# Patient Record
Sex: FEMALE | Race: WHITE | Employment: UNEMPLOYED
[De-identification: names, ages, dates, MRNs, and addresses within clinical notes are randomized per-mention and may not be internally consistent; named-entity substitution may affect disease eponyms.]

---

## 2020-11-24 ENCOUNTER — TRANSCRIBE ORDERS (OUTPATIENT)
Dept: OTHER | Age: 45
End: 2020-11-24

## 2020-11-24 DIAGNOSIS — C50.919 BREAST CANCER (H): Primary | ICD-10-CM

## 2020-11-25 ENCOUNTER — DOCUMENTATION ONLY (OUTPATIENT)
Dept: ONCOLOGY | Facility: CLINIC | Age: 45
End: 2020-11-25

## 2020-11-25 NOTE — PROGRESS NOTES
RECORDS STATUS - BREAST    RECORDS REQUESTED FROM: [employer] [facility]   DATE REQUESTED: N/A   NOTES DETAILS STATUS   OFFICE NOTE from referring provider     OFFICE NOTE from medical oncologist Complete CE    OFFICE NOTE from surgeon Complete See Biopsy Report Below    OFFICE NOTE from radiation oncologist     DISCHARGE SUMMARY from hospital     DISCHARGE REPORT from the ER     OPERATIVE REPORT Complete See Surgical Path Report In CE   MEDICATION LIST Complete CE   CLINICAL TRIAL TREATMENTS TO DATE     LABS     PATHOLOGY REPORTS  (Tissue diagnosis, Stage, ER/NJ percentage positive and intensity of staining, HER2 IHC, FISH, and all biopsies from breast and any distant metastasis)                 Received on 12/4/2020 12:19pm [facility]  Tracking Number:   [id number]   Surgical Pathology   11/13/2020                               Case: [id number]    10/29/2020  Case: [id number]    GENONOMIC TESTING     TYPE:   (Next Generation Sequencing, including Foundation One testing, and Oncotype score)     IMAGING (NEED IMAGES & REPORT)     CT SCANS     MRI Complete- [facility] MR Bx Breast 11/13/2020, 11/6/2020   Xray Chest Complete- [facility] 1/20/2017   MAMMO Complete- [facility] 11/13/2020, 10/29/2020, 10/22/2020, 1/22/2019, 5/18/2017   ULTRASOUND Complete- [facility] 10/29/2020   PET     BONE SCAN     BRAIN MRI           Action    Action Taken 11/25/2020 3:16pm     I faxed requests over to [facility] for IMG and Bx slides

## 2020-12-07 PROCEDURE — 999N001032 HC STATISTIC REVIEW OUTSIDE SLIDES TC 88321: Performed by: SURGERY

## 2020-12-07 PROCEDURE — 88321 CONSLTJ&REPRT SLD PREP ELSWR: CPT | Mod: 26 | Performed by: PATHOLOGY

## 2020-12-08 LAB — COPATH REPORT: NORMAL

## 2020-12-09 NOTE — PROGRESS NOTES
NEW SURGICAL CONSULTATION  Dec 10, 2020    Diana Rutherford is a 45 year old woman who presents with a right  breast complaint.  She was self-referred for a second opinion.    HPI:    She noted no masses in either breast, axilla, or neck. She denies any nipple discharge. She has longstanding bilateral nipple inversion.     Imaging in 2020 showed a 1.0 mass with surrounding calcifications measuring 3.4 cm at 3:00 6 cm FN in the RIGHT breast. Additional calcifications were seen at 6:00 in the RIGHT breast.    A biopsy was performed of the 3:00 mass and a clip was placed.  The pathology read from [facility] was invasive lobular carcinoma.  A biopsy was also performed of the 6:00 area, which showed atypical lobular hyperplasia and flat epithelial atypia.    She was then evaluated by Dr Lorna Hidalgo. A breast MRI was obtained, which showed the 11 mm 3:00 RIGHT breast mass with biopsy clip, and additional 12:00 7 mm non mass enhancement in the RIGHT.  On the LEFT, nonmass enhancement was seen at 2:00 and at 5-6:00.  The 2:00 LEFT breast area was biopsied and was benign.    The slides for the RIGHT 3:00 cancer was reviewed here, which showed invasive mixed ductal and lobular carcinoma, grade 2, ER+ MT+ HER2/ilan negative.    BREAST-SPECIFIC HISTORY:  Prior breast biopsies: No  Prior breast surgeries: No  Prior radiation history: No  Hormone replacement therapy: Yes intermittently since  (off for several years), consistent since [id number]  Bra size: 32 C  Dominant hand: Right    GYN HISTORY:    Age at 1st pregnancy: n/a  Age at menarche: 14  Breastfeeding history: No  Menopausal? Post - GAY BSO in  for endometriosis    FAMILY HISTORY:  Breast ca: No  Ovarian ca: No  Pancreatic ca: No  Melanoma: No - father with skin cancer of some sort on H&N, also multiple myeloma  Gastric ca: No  Colon ca: No  Other cancer: Yes - MGF w/ lung ca    Genetic testing at [facility] (11/10/2020):  - VUS in DIS3L2  -  VUS in POLE  - no pathogenic variants in: ABRAXAS1, AIP, AKT1, ALK, APC, LISANDRO, AXIN2, BAP1, BARD1, BLM, BMPR1A, BRCA1, BRCA2, BRIP1, CASR, CDC73, CDH1, CDK4, CDKN1B, CDKN1C, CDKN2A (p14ARF), CDKN2A ([id number]), CEBPA, CHEK2, CTNNA1, DICER1, DIS3L2, EGFR, EPCAM*, FANCC, FANCM, FH, FLCN, GATA2, GPC3, GREM1*, HOXB13, HRAS, KIT, MAX, MEN1, MET, MITF*, MLH1, MRE11, MSH2, MSH3, MSH6, MUTYH, NBN, NF1, NF2, NTHL1, PALB2, PDGFRA, PHOX2B*, PIK3CA, PMS2, POLD1, POLE, POT1, JDZSR6P, PTCH1, PTEN, RAD50, RAD51C, RAD51D, RB1, RECQL, RECQL4, RET, RINT1, RUNX1, SDHA*, SDHAF2, SDHB, SDHC, SDHD, SMAD4, SMARCA4, SMARCB1, SMARCE1, STK11, SUFU, TERC, TERT, AAVR388, TP53, TSC1, TSC2, VHL, WRN*, WT1, XRCC2    Past Medical History:   Diagnosis Date     Asthma      Human immunodeficiency virus (HIV) disease (H)      Past Surgical History:   Procedure Laterality Date     HYSTERECTOMY TOTAL ABDOMINAL, BILATERAL SALPINGO-OOPHORECTOMY, COMBINED     No GA issues    Current Outpatient Medications   Medication Sig Dispense Refill     bictegravir-emtricitabine-tenofovir (BIKTARVY) -25 MG per tablet Take 1 Tablet by mouth daily.       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 Has horses and is buying a hobby farm. Plans on moving just prior to the new year.    ROS:  Back pain: No  Headache: No  Abdominal pain: No  Unexpected weight loss: No  Easy bruising/bleeding: No    /70   Pulse 74   Temp 98.6  F (37  C) (Oral)   Wt 59.6 kg (131 lb 4.8 oz)   SpO2 99%    Physical Exam  Constitutional:       Appearance: She is well-developed.   Chest:      Breasts: Breasts are symmetrical.         Right: No inverted nipple, mass, nipple discharge, skin change or tenderness.         Left: No inverted nipple, mass, nipple discharge, skin change or  tenderness.          Comments: Patient was examined in both supine and upright positions.   Lymphadenopathy:      Cervical: No cervical adenopathy.      Right cervical: No superficial, deep or posterior cervical adenopathy.     Left cervical: No superficial, deep or posterior cervical adenopathy.      Upper Body:      Right upper body: No supraclavicular, axillary or pectoral adenopathy.      Left upper body: No supraclavicular, axillary or pectoral adenopathy.      Comments: No lymphedema in bilateral upper extremities.   Skin:     General: Skin is warm and dry.        INVESTIGATIONS:    Bilateral Breast MRI from [facility] (11/6/2020) showed:  RIGHT BREAST: There is extreme fibroglandular tissue.  There is moderate background parenchymal enhancement. Biopsy-proven malignancy in the medial breast is seen as a 10 x 10 x 11 mm enhancing mass with mixed persistent and plateau enhancement kinetics at 3:00 6 cm from the nipple at posterior depth. Biopsy marking clip is seen within the mass. Otherwise, there is mild nonmass enhancement with type I persistent kinetics in the inferior breast posterior depth at site of biopsy yielding flat epithelial atypia. Focal 7 x 6 mm nonmass enhancement in the superior breast at 12:00 middle depth with type I persistent enhancement kinetics (CADstream MIP images and subtracted axial series image 73/120); this area appears similar to the two areas in the left breast. It is unclear if this represents background enhancement. No axillary or internal mammary chain adenopathy.   LEFT BREAST: There is extreme fibroglandular tissue.  There is mild background parenchymal enhancement. There are a couple areas of vague nonspecific nonmass enhancement with type I persistent kinetics in the lateral breast at 2:00 middle depth (CADstream MIP images and subtracted axial series image 57/120) and in the inferior breast at 5-6 o'clock posterior depth (image 40/120). No axillary or internal mammary  chain adenopathy.  IMPRESSION:   1. Biopsy-proven malignancy in the medial right breast at 3:00 posterior depth measures 10 x 10 x 11 mm with mild nonmass enhancement noted in the inferior breast at 6:00 posterior depth at site of biopsy yielding flat epithelial atypia.  2. Focal nonmass enhancement in the superior right breast at 12:00 middle depth as well as two similar areas in the lateral left breast and inferior left breast. These areas demonstrate mild nonspecific enhancement with persistent type I enhancement kinetics. Given known right invasive lobular carcinoma, MRI-guided biopsy of one of these areas is recommended, such as the right 12:00 middle depth.  3. No axillary or internal mammary chain adenopathy.  ACR BI-RADS Category 4: Suspicious.    Diagnostic Mammogram & Ultrasound from [facility] (10/29/2020) showed:  MAMMOGRAPHIC FINDINGS: Right full-field digital diagnostic mammogram performed. The right breast is extremely dense, which lowers the sensitivity of mammography. Images evaluated with the assistance of CAD. Magnification views of the area of interest in the right breast were performed. There is a spiculated mass visualized in the right breast at 3:00 posterior depth. There are numerous groups of punctate calcifications nearly circumferentially around the mass with greatest extent measuring up to 3.4 cm (anteroposterior). Additionally there are punctate and coarse heterogeneous calcifications seen at 6 o'clock position posterior depth.  ULTRASOUND FINDINGS: Targeted ultrasound performed of the right breast demonstrates a 1.0 x 0.6 x 1.0 cm mass at 3:00 6 cm from the nipple. This correlates with the mass seen on mammography. There is adjacent vascularity demonstrated on color flow imaging. No other abnormalities identified sonographically.  IMPRESSION:  1. Spiculated mass in the right breast at 3:00 6 cm from the nipple is highly suspicious of malignancy. Multiple groupings of punctate  calcifications along the periphery of the mass are also suspicious for ductal carcinoma in situ (DCIS). Given their close proximity to the mass. Separate stereotactic biopsy of those calcifications was not performed at this time. If in the course of the management of the patient, biopsy of a set of these calcifications is needed, that can be performed on a later date.  2. Grouping of punctate and coarse heterogeneous calcifications in the right breast at 6:00 posterior depth are suspicious abnormality. Given their distance from the mass and other calcifications, stereotactic biopsy is recommended and was subsequently performed today.  ACR BI-RADS Category 5: Highly Suggestive of Malignancy.    [facility]10[facility]22[facility]2020) showed:  FINDINGS: Bilateral screening mammogram was performed with the assistance  of Computer-Aided Detection and breast tomosynthesis. The breasts are extremely dense, which lowers the sensitivity of mammography.   There are indeterminate calcifications in the right breast at the 6  o'clock position at posterior depth and 5 o'clock position at posterior depth.  The remainder of the breast tissue is unremarkable.  ACR BI-RADS Category 0: Need Additional Imaging Evaluation    Biopsy from [facility] (11/13/2020) showed:  A.  Breast, left, 2:00 middle depth, needle core biopsy:  Benign breast tissue with no significant pathologic abnormality.  Negative for atypia and malignancy.    Biopsy (10/29/2020) showed:  FINAL DIAGNOSIS:   CASE FROM [place], [place] ([id number], OBTAINED   10/29/2020):   A. RIGHT BREAST, 3:00, 6 CM FROM NIPPLE, NEEDLE CORE BIOPSY:   - INVASIVE MAMMARY CARCINOMA WITH MIXED LOBULAR AND DUCTAL FEATURES, Peyton grade 2, 10 mm in linear extent.   - Microscopic calcifications associated with invasive carcinoma.   - Other findings include apocrine metaplasia and microcysts.   - Invasive carcinoma is estrogen receptor positive (70-80%  nuclei, moderate to strong staining), progesterone receptor positive (80-85% nuclei, strong staining), and Ysx5rar gene is negative/not amplified by immunohistochemistry (score 1+).   B. RIGHT BREAST, CALCIFICATIONS, 6:00 POSTERIOR, NEEDLE CORE BIOPSY:   - Atypical lobular hyperplasia.   - Fibrocystic change with apocrine metaplasia and intraluminal calcifications.   - Negative for invasive malignancy.   C. RIGHT BREAST, NON-CALCIFICATIONS, 6:00 POSTERIOR, NEEDLE CORE BIOPSY:   - Focal flat epithelial atypia, columnar cell change, micropapillary hyperplasia and usual ductal hyperplasia.   - Negative for in-situ or invasive malignancy.   COMMENT:   Appropriately controlled immunostains are received from the referring institution. The carcinoma in part A shows only rare neoplastic cells with retained E-cadherin expression. However, in some areas there is clear morphologic evidence of duct/atypical gland formations, therefore this neoplasm is best classified as invasive breast carcinoma with mixed lobular and ductal features.     ASSESSMENT:  Diana Rutherford is a 45 year old woman with RIGHT breast cancer (3:00) and right breast atypical lobular hyperplasia (6:00).    Her stage is:  Cancer Staging  Malignant neoplasm of upper-inner quadrant of right breast in female, estrogen receptor positive (H)  Staging form: Breast, AJCC 8th Edition  - Clinical: Stage IA (cT1c, cN0, cM0, G2, ER+, MS+, HER2-) - Signed by Jess Medina MD on 12/10/2020    I personally reviewed her breast imaging with our in-house radiologist.  If she is considering breast conservation, I would recommend needle biopsy of the 12:00 RIGHT enhancing mass seen on MRI.    I reviewed the imaging, diagnosis, staging, and management (including surgery, chemotherapy, radiation therapy, and endocrine therapy) of breast cancer with Diana Rutherford and her  by phone. A copy of the biopsy pathology report was also provided.    The mainstay of  "treatment for resectable breast cancer is surgical resection, in the form of either breast conservation (segmental mastectomy plus radiation) or mastectomy.  We reviewed that the two strategies are equivalent in terms of overall survival.  The advantages and disadvantages of each were discussed.   Diana Rutherford IS a candidate for breast conservation therapy.  We discussed that this involves two necessary components: the lumpectomy (or \"segmental mastectomy\"), and several weeks of whole breast radiation therapy.  We discussed that the overall survival after breast conservation therapy is identical to mastectomy and that local recurrence rates are significantly higher if segmental mastectomy was performed without subsequent radiation.  We also discussed the significance of clear margins and that a subsequent procedure may be necessary to achieve this.    We discussed that both the cancer at 3:00 and the atypia at 6:00 should be resected.  Because the 6:00 lesion is not palpable, a wire-localized approach would be taken for breast conservation.  She would present on the day of surgery for an image-guided wire placement, followed by a surgical excision in the operating room.  The risks of a wire-localized segmental mastectomy were discussed with the patient and family, including the risks of bleeding, wound infection, wound dehiscence, and post-operative contour change to the breast.   We discussed that a Plastic Surgery consultation could be considered for possible oncoplastic strategies.    Alternatively, we also discussed the various types of mastectomy, including total, skin-sparing, and nipple-sparing mastectomy.  We reviewed that the nipple-sparing technique is cosmetic; sensation and contractility will likely be lost.  Diana Rutherford is a candidate for nipple-sparing mastectomy.  The risks of a mastectomy were discussed with the patient and family, including the risks of bleeding, wound infection, wound " dehiscence, skin flap/nipple necrosis, poor cosmetic outcomes with skin folds, decreased shoulder range of motion, chest wall numbness, etc.    The option of having immediate versus delayed reconstruction was also discussed.   We reviewed that the advantages of immediate reconstruction includes superior cosmetics, as the skin is preserved.  However, the major disadvantage is increased postoperative risks, including skin flap ischemia and expander infection, which can potentially delay adjuvant oncologic treatments which may be needed post-surgically.  Diana Rutherford was interested in this; a Plastic Surgery consultation was offered and will be arranged. Depending on the margin and rocco status post-mastectomy, radiation may be necessary.    With regards to the contralateral breast, we reviewed the risk reduction benefits of a contralateral risk-reducing mastectomy. We reviewed that a risk-reducing mastectomy does not eliminate the risk of breast cancer entirely, but rather is a relative risk reduction strategy. Given her negative genetic testing, the oncologic benefit of a risk-reducing mastectomy is low. She is no longer interested in a contralateral risk-reducing mastectomy.      In addition to the surgical management of the breast, a sentinel lymph node biopsy is recommended for rocco staging of the axilla.  This is performed with the combination of the radioactive colloid and lymphazurin. The risks of a sentinel lymph node biopsy were discussed with the patient and family, including the risks of lymphedema (5-10%), bleeding, wound infection, wound dehiscence, seroma formation, and paresthesias. There is an approximately 10% false negative rate associated with sentinel lymph node biopsy as published in the literature.  The findings of the sentinel lymph node biopsy may result in the need for further surgery (i.e. Axillary lymph node dissection) or radiation. There is a 1-2% chance of patients whose sentinel lymph  nodes do not map despite dual tracer (radiocolloid and lymphazurin). Should this be the case, we discussed that I would proceed with an axillary lymph node dissection at the index procedure.  The higher risks of an axillary lymph node dissection were also reviewed, including lymphedema (20-30%), bleeding, wound infection, wound dehiscence, seroma formation, nerve injury, limited arm range of motion and paresthesias. We discussed that a drain would be placed intra-operatively should an axillary lymph node dissection be performed.     We discussed that surgical pathology results will be reviewed at the postoperative visit to allow for careful discussion of next steps and for answering questions.    Finally, we reviewed that as part of team-based approach to breast cancer, medical oncology and radiation oncology referrals will also be made after surgery to discuss adjuvant systemic/endocrine therapy and radiation therapy.  The decision regarding adjuvant chemotherapy will be made after surgery.     We reviewed that patients with COVID who undergo surgery have increased perioperative risks and risks of severe COVID-19 disease. If her preoperative testing is positive or if she is symptomatic, surgery would be rescheduled.       All of the above was discussed with Diana Rutherford and all questions were answered.  She elected to proceed with MRI guided right breast biopsy.  She will consider her surgical options and call the office with a decision.    Total time spent with the patient was 90 minutes, of which 75% was counseling.     PLAN:  1. MRI guided RIGHT breast biopsy  2. Plastic surgery referral  3. RIGHT wire-localized segmental mastectomy (3:00 cancer AND 6:00 atypia) vs RIGHT mastectomy (skin-sparing, nipple-sparing, or simple)  4. RIGHT axillary sentinel lymph node biopsy, possible axillary node dissection  5. Patient to report to her PCP for preoperative H&P and testing.    Jess Medina MD MSc [facility] FACS  Assistant    Division of Surgical Oncology  [facility]

## 2020-12-10 ENCOUNTER — OFFICE VISIT (OUTPATIENT)
Dept: ONCOLOGY | Facility: CLINIC | Age: 45
End: 2020-12-10
Attending: SURGERY
Payer: COMMERCIAL

## 2020-12-10 ENCOUNTER — TELEPHONE (OUTPATIENT)
Dept: PLASTIC SURGERY | Facility: CLINIC | Age: 45
End: 2020-12-10

## 2020-12-10 VITALS
OXYGEN SATURATION: 99 % | WEIGHT: 131.3 LBS | HEART RATE: 74 BPM | DIASTOLIC BLOOD PRESSURE: 70 MMHG | TEMPERATURE: 98.6 F | SYSTOLIC BLOOD PRESSURE: 130 MMHG

## 2020-12-10 DIAGNOSIS — C50.211 MALIGNANT NEOPLASM OF UPPER-INNER QUADRANT OF RIGHT BREAST IN FEMALE, ESTROGEN RECEPTOR POSITIVE (H): ICD-10-CM

## 2020-12-10 DIAGNOSIS — Z17.0 MALIGNANT NEOPLASM OF UPPER-INNER QUADRANT OF RIGHT BREAST IN FEMALE, ESTROGEN RECEPTOR POSITIVE (H): ICD-10-CM

## 2020-12-10 PROCEDURE — G0463 HOSPITAL OUTPT CLINIC VISIT: HCPCS

## 2020-12-10 PROCEDURE — 99205 OFFICE O/P NEW HI 60 MIN: CPT | Performed by: SURGERY

## 2020-12-10 RX ORDER — BICTEGRAVIR SODIUM, EMTRICITABINE, AND TENOFOVIR ALAFENAMIDE FUMARATE 50; 200; 25 MG/1; MG/1; MG/1
TABLET ORAL
COMMUNITY
Start: 2020-02-03

## 2020-12-10 RX ORDER — LORATADINE 10 MG/1
TABLET ORAL
COMMUNITY

## 2020-12-10 SDOH — HEALTH STABILITY: MENTAL HEALTH: HOW OFTEN DO YOU HAVE A DRINK CONTAINING ALCOHOL?: MONTHLY OR LESS

## 2020-12-10 SDOH — HEALTH STABILITY: MENTAL HEALTH: HOW MANY STANDARD DRINKS CONTAINING ALCOHOL DO YOU HAVE ON A TYPICAL DAY?: NOT ASKED

## 2020-12-10 SDOH — HEALTH STABILITY: MENTAL HEALTH: HOW OFTEN DO YOU HAVE 6 OR MORE DRINKS ON ONE OCCASION?: NOT ASKED

## 2020-12-10 ASSESSMENT — PAIN SCALES - GENERAL: PAINLEVEL: NO PAIN (0)

## 2020-12-10 NOTE — LETTER
Date:December 15, 2020      Patient was self referred, no letter generated. Do not send.        [facility] Health Information

## 2020-12-10 NOTE — NURSING NOTE
Oncology Rooming Note    December 10, 2020 12:08 PM   Diana Rutherford is a 45 year old female who presents for:    Chief Complaint   Patient presents with     Oncology Clinic Visit     Breast Cancer     Initial Vitals: /70   Pulse 74   Temp 98.6  F (37  C) (Oral)   Wt 59.6 kg (131 lb 4.8 oz)   SpO2 99%  There is no height or weight on file to calculate BMI. There is no height or weight on file to calculate BSA.  No Pain (0) Comment: Data Unavailable   No LMP recorded.  Allergies reviewed: Yes  Medications reviewed: Yes    Medications: Medication refills not needed today.  Pharmacy name entered into [employer]: [id number] - [place], [place] - [address]    Clinical concerns: No specific concern       Ne Castañeda CMA

## 2020-12-10 NOTE — TELEPHONE ENCOUNTER
LVM for patient to call back to schedule an appt with Dr. Preciado. Referral from Dr. Medina.    Linwood Arceo LPN

## 2020-12-10 NOTE — LETTER
12/10/2020         RE: Diana Rutherford  [address] [facility] [address]        Dear Colleague,    Thank you for referring your patient, Diana Rutherford, to the [facility] BREAST [facility]. Please see a copy of my visit note below.    NEW SURGICAL CONSULTATION  Dec 10, 2020    Diana Rutherford is a 45 year old woman who presents with a right  breast complaint.  She was self-referred for a second opinion.    HPI:    She noted no masses in either breast, axilla, or neck. She denies any nipple discharge. She has longstanding bilateral nipple inversion.     Imaging in 2020 showed a 1.0 mass with surrounding calcifications measuring 3.4 cm at 3:00 6 cm FN in the RIGHT breast. Additional calcifications were seen at 6:00 in the RIGHT breast.    A biopsy was performed of the 3:00 mass and a clip was placed.  The pathology read from [facility] was invasive lobular carcinoma.  A biopsy was also performed of the 6:00 area, which showed atypical lobular hyperplasia and flat epithelial atypia.    She was then evaluated by Dr Lorna Hidalgo. A breast MRI was obtained, which showed the 11 mm 3:00 RIGHT breast mass with biopsy clip, and additional 12:00 7 mm non mass enhancement in the RIGHT.  On the LEFT, nonmass enhancement was seen at 2:00 and at 5-6:00.  The 2:00 LEFT breast area was biopsied and was benign.    The slides for the RIGHT 3:00 cancer was reviewed here, which showed invasive mixed ductal and lobular carcinoma, grade 2, ER+ [place]+ HER2/ilan negative.    BREAST-SPECIFIC HISTORY:  Prior breast biopsies: No  Prior breast surgeries: No  Prior radiation history: No  Hormone replacement therapy: Yes intermittently since  (off for several years), consistent since [id number]  Bra size: 32 C  Dominant hand: Right    GYN HISTORY:    Age at 1st pregnancy: n/a  Age at menarche: 14  Breastfeeding history: No  Menopausal? Post - GAY BSO in  for endometriosis    FAMILY HISTORY:  Breast  ca: No  Ovarian ca: No  Pancreatic ca: No  Melanoma: No - father with skin cancer of some sort on H&N, also multiple myeloma  Gastric ca: No  Colon ca: No  Other cancer: Yes - MGF w/ lung ca    Genetic testing at [facility] (11/10/2020):  - VUS in DIS3L2  - VUS in POLE  - no pathogenic variants in: ABRAXAS1, AIP, AKT1, ALK, APC, LISANDRO, AXIN2, BAP1, BARD1, BLM, BMPR1A, BRCA1, BRCA2, BRIP1, CASR, CDC73, CDH1, CDK4, CDKN1B, CDKN1C, CDKN2A (p14ARF), CDKN2A (c34QZI1v), CEBPA, CHEK2, CTNNA1, DICER1, DIS3L2, EGFR, EPCAM*, FANCC, FANCM, FH, FLCN, GATA2, GPC3, GREM1*, HOXB13, HRAS, KIT, MAX, MEN1, MET, MITF*, MLH1, MRE11, MSH2, MSH3, MSH6, MUTYH, NBN, NF1, NF2, NTHL1, PALB2, PDGFRA, PHOX2B*, PIK3CA, PMS2, POLD1, POLE, POT1, RNRXB5A, PTCH1, PTEN, RAD50, RAD51C, RAD51D, RB1, RECQL, RECQL4, RET, RINT1, RUNX1, SDHA*, SDHAF2, SDHB, SDHC, SDHD, SMAD4, SMARCA4, SMARCB1, SMARCE1, STK11, SUFU, TERC, TERT, NAJD002, TP53, TSC1, TSC2, VHL, WRN*, WT1, XRCC2    Past Medical History:   Diagnosis Date     Asthma      Human immunodeficiency virus (HIV) disease (H)      Past Surgical History:   Procedure Laterality Date     HYSTERECTOMY TOTAL ABDOMINAL, BILATERAL SALPINGO-OOPHORECTOMY, COMBINED     No GA issues    Current Outpatient Medications   Medication Sig Dispense Refill     bictegravir-emtricitabine-tenofovir (BIKTARVY) -25 MG per tablet Take 1 Tablet by mouth daily.       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 Has horses and is buying a hobby farm. Plans on moving just prior to the new year.    ROS:  Back pain: No  Headache: No  Abdominal pain: No  Unexpected weight loss: No  Easy bruising/bleeding: No    /70   Pulse 74   Temp 98.6  F (37  C) (Oral)   Wt 59.6 kg (131 lb 4.8 oz)   SpO2 99%    Physical  Exam  Constitutional:       Appearance: She is well-developed.   Chest:      Breasts: Breasts are symmetrical.         Right: No inverted nipple, mass, nipple discharge, skin change or tenderness.         Left: No inverted nipple, mass, nipple discharge, skin change or tenderness.          Comments: Patient was examined in both supine and upright positions.   Lymphadenopathy:      Cervical: No cervical adenopathy.      Right cervical: No superficial, deep or posterior cervical adenopathy.     Left cervical: No superficial, deep or posterior cervical adenopathy.      Upper Body:      Right upper body: No supraclavicular, axillary or pectoral adenopathy.      Left upper body: No supraclavicular, axillary or pectoral adenopathy.      Comments: No lymphedema in bilateral upper extremities.   Skin:     General: Skin is warm and dry.        INVESTIGATIONS:    Bilateral Breast MRI from [facility] (11/6/2020) showed:  RIGHT BREAST: There is extreme fibroglandular tissue.  There is moderate background parenchymal enhancement. Biopsy-proven malignancy in the medial breast is seen as a 10 x 10 x 11 mm enhancing mass with mixed persistent and plateau enhancement kinetics at 3:00 6 cm from the nipple at posterior depth. Biopsy marking clip is seen within the mass. Otherwise, there is mild nonmass enhancement with type I persistent kinetics in the inferior breast posterior depth at site of biopsy yielding flat epithelial atypia. Focal 7 x 6 mm nonmass enhancement in the superior breast at 12:00 middle depth with type I persistent enhancement kinetics (CADstream MIP images and subtracted axial series image 73/120); this area appears similar to the two areas in the left breast. It is unclear if this represents background enhancement. No axillary or internal mammary chain adenopathy.   LEFT BREAST: There is extreme fibroglandular tissue.  There is mild background parenchymal enhancement. There are a couple areas of vague  nonspecific nonmass enhancement with type I persistent kinetics in the lateral breast at 2:00 middle depth (CADstream MIP images and subtracted axial series image 57/120) and in the inferior breast at 5-6 o'clock posterior depth (image 40/120). No axillary or internal mammary chain adenopathy.  IMPRESSION:   1. Biopsy-proven malignancy in the medial right breast at 3:00 posterior depth measures 10 x 10 x 11 mm with mild nonmass enhancement noted in the inferior breast at 6:00 posterior depth at site of biopsy yielding flat epithelial atypia.  2. Focal nonmass enhancement in the superior right breast at 12:00 middle depth as well as two similar areas in the lateral left breast and inferior left breast. These areas demonstrate mild nonspecific enhancement with persistent type I enhancement kinetics. Given known right invasive lobular carcinoma, MRI-guided biopsy of one of these areas is recommended, such as the right 12:00 middle depth.  3. No axillary or internal mammary chain adenopathy.  ACR BI-RADS Category 4: Suspicious.    Diagnostic Mammogram & Ultrasound from [facility] (10/29/2020) showed:  MAMMOGRAPHIC FINDINGS: Right full-field digital diagnostic mammogram performed. The right breast is extremely dense, which lowers the sensitivity of mammography. Images evaluated with the assistance of CAD. Magnification views of the area of interest in the right breast were performed. There is a spiculated mass visualized in the right breast at 3:00 posterior depth. There are numerous groups of punctate calcifications nearly circumferentially around the mass with greatest extent measuring up to 3.4 cm (anteroposterior). Additionally there are punctate and coarse heterogeneous calcifications seen at 6 o'clock position posterior depth.  ULTRASOUND FINDINGS: Targeted ultrasound performed of the right breast demonstrates a 1.0 x 0.6 x 1.0 cm mass at 3:00 6 cm from the nipple. This correlates with the mass seen on  mammography. There is adjacent vascularity demonstrated on color flow imaging. No other abnormalities identified sonographically.  IMPRESSION:  1. Spiculated mass in the right breast at 3:00 6 cm from the nipple is highly suspicious of malignancy. Multiple groupings of punctate calcifications along the periphery of the mass are also suspicious for ductal carcinoma in situ (DCIS). Given their close proximity to the mass. Separate stereotactic biopsy of those calcifications was not performed at this time. If in the course of the management of the patient, biopsy of a set of these calcifications is needed, that can be performed on a later date.  2. Grouping of punctate and coarse heterogeneous calcifications in the right breast at 6:00 posterior depth are suspicious abnormality. Given their distance from the mass and other calcifications, stereotactic biopsy is recommended and was subsequently performed today.  ACR BI-RADS Category 5: Highly Suggestive of Malignancy.    Screening Mammogram from [facility] (10/22/2020) showed:  FINDINGS: Bilateral screening mammogram was performed with the assistance  of Computer-Aided Detection and breast tomosynthesis. The breasts are extremely dense, which lowers the sensitivity of mammography.   There are indeterminate calcifications in the right breast at the 6  o'clock position at posterior depth and 5 o'clock position at posterior depth.  The remainder of the breast tissue is unremarkable.  ACR BI-RADS Category 0: Need Additional Imaging Evaluation    Biopsy from [facility] (11/13/2020) showed:  A.  Breast, left, 2:00 middle depth, needle core biopsy:  Benign breast tissue with no significant pathologic abnormality.  Negative for atypia and malignancy.    Biopsy (10/29/2020) showed:  FINAL DIAGNOSIS:   CASE FROM [place], [place] ([id number], OBTAINED   10/29/2020):   A. RIGHT BREAST, 3:00, 6 CM FROM NIPPLE, NEEDLE CORE BIOPSY:   - INVASIVE MAMMARY CARCINOMA  WITH MIXED LOBULAR AND DUCTAL FEATURES, [place] grade 2, 10 mm in linear extent.   - Microscopic calcifications associated with invasive carcinoma.   - Other findings include apocrine metaplasia and microcysts.   - Invasive carcinoma is estrogen receptor positive (70-80% nuclei, moderate to strong staining), progesterone receptor positive (80-85% nuclei, strong staining), and Acs1mdz gene is negative/not amplified by immunohistochemistry (score 1+).   B. RIGHT BREAST, CALCIFICATIONS, 6:00 POSTERIOR, NEEDLE CORE BIOPSY:   - Atypical lobular hyperplasia.   - Fibrocystic change with apocrine metaplasia and intraluminal calcifications.   - Negative for invasive malignancy.   C. RIGHT BREAST, NON-CALCIFICATIONS, 6:00 POSTERIOR, NEEDLE CORE BIOPSY:   - Focal flat epithelial atypia, columnar cell change, micropapillary hyperplasia and usual ductal hyperplasia.   - Negative for in-situ or invasive malignancy.   COMMENT:   Appropriately controlled immunostains are received from the referring institution. The carcinoma in part A shows only rare neoplastic cells with retained E-cadherin expression. However, in some areas there is clear morphologic evidence of duct/atypical gland formations, therefore this neoplasm is best classified as invasive breast carcinoma with mixed lobular and ductal features.     ASSESSMENT:  Diana Rutherfrod is a 45 year old woman with RIGHT breast cancer (3:00) and right breast atypical lobular hyperplasia (6:00).    Her stage is:  Cancer Staging  Malignant neoplasm of upper-inner quadrant of right breast in female, estrogen receptor positive (H)  Staging form: Breast, AJCC 8th Edition  - Clinical: Stage IA (cT1c, cN0, cM0, G2, ER+, RI+, HER2-) - Signed by Jess Medina MD on 12/10/2020    I personally reviewed her breast imaging with our in-house radiologist.  If she is considering breast conservation, I would recommend needle biopsy of the 12:00 RIGHT enhancing mass seen on MRI.    I  "reviewed the imaging, diagnosis, staging, and management (including surgery, chemotherapy, radiation therapy, and endocrine therapy) of breast cancer with Diana Rutherford and her  by phone. A copy of the biopsy pathology report was also provided.    The mainstay of treatment for resectable breast cancer is surgical resection, in the form of either breast conservation (segmental mastectomy plus radiation) or mastectomy.  We reviewed that the two strategies are equivalent in terms of overall survival.  The advantages and disadvantages of each were discussed.   Diana Rutherford IS a candidate for breast conservation therapy.  We discussed that this involves two necessary components: the lumpectomy (or \"segmental mastectomy\"), and several weeks of whole breast radiation therapy.  We discussed that the overall survival after breast conservation therapy is identical to mastectomy and that local recurrence rates are significantly higher if segmental mastectomy was performed without subsequent radiation.  We also discussed the significance of clear margins and that a subsequent procedure may be necessary to achieve this.    We discussed that both the cancer at 3:00 and the atypia at 6:00 should be resected.  Because the 6:00 lesion is not palpable, a wire-localized approach would be taken for breast conservation.  She would present on the day of surgery for an image-guided wire placement, followed by a surgical excision in the operating room.  The risks of a wire-localized segmental mastectomy were discussed with the patient and family, including the risks of bleeding, wound infection, wound dehiscence, and post-operative contour change to the breast.   We discussed that a Plastic Surgery consultation could be considered for possible oncoplastic strategies.    Alternatively, we also discussed the various types of mastectomy, including total, skin-sparing, and nipple-sparing mastectomy.  We reviewed that the " nipple-sparing technique is cosmetic; sensation and contractility will likely be lost.  Diana Rutherford is a candidate for nipple-sparing mastectomy.  The risks of a mastectomy were discussed with the patient and family, including the risks of bleeding, wound infection, wound dehiscence, skin flap/nipple necrosis, poor cosmetic outcomes with skin folds, decreased shoulder range of motion, chest wall numbness, etc.    The option of having immediate versus delayed reconstruction was also discussed.   We reviewed that the advantages of immediate reconstruction includes superior cosmetics, as the skin is preserved.  However, the major disadvantage is increased postoperative risks, including skin flap ischemia and expander infection, which can potentially delay adjuvant oncologic treatments which may be needed post-surgically.  Diana Rutherford was interested in this; a Plastic Surgery consultation was offered and will be arranged. Depending on the margin and rocco status post-mastectomy, radiation may be necessary.    With regards to the contralateral breast, we reviewed the risk reduction benefits of a contralateral risk-reducing mastectomy. We reviewed that a risk-reducing mastectomy does not eliminate the risk of breast cancer entirely, but rather is a relative risk reduction strategy. Given her negative genetic testing, the oncologic benefit of a risk-reducing mastectomy is low. She is no longer interested in a contralateral risk-reducing mastectomy.      In addition to the surgical management of the breast, a sentinel lymph node biopsy is recommended for rocco staging of the axilla.  This is performed with the combination of the radioactive colloid and lymphazurin. The risks of a sentinel lymph node biopsy were discussed with the patient and family, including the risks of lymphedema (5-10%), bleeding, wound infection, wound dehiscence, seroma formation, and paresthesias. There is an approximately 10% false negative  rate associated with sentinel lymph node biopsy as published in the literature.  The findings of the sentinel lymph node biopsy may result in the need for further surgery (i.e. Axillary lymph node dissection) or radiation. There is a 1-2% chance of patients whose sentinel lymph nodes do not map despite dual tracer (radiocolloid and lymphazurin). Should this be the case, we discussed that I would proceed with an axillary lymph node dissection at the index procedure.  The higher risks of an axillary lymph node dissection were also reviewed, including lymphedema (20-30%), bleeding, wound infection, wound dehiscence, seroma formation, nerve injury, limited arm range of motion and paresthesias. We discussed that a drain would be placed intra-operatively should an axillary lymph node dissection be performed.     We discussed that surgical pathology results will be reviewed at the postoperative visit to allow for careful discussion of next steps and for answering questions.    Finally, we reviewed that as part of team-based approach to breast cancer, medical oncology and radiation oncology referrals will also be made after surgery to discuss adjuvant systemic/endocrine therapy and radiation therapy.  The decision regarding adjuvant chemotherapy will be made after surgery.     We reviewed that patients with COVID who undergo surgery have increased perioperative risks and risks of severe COVID-19 disease. If her preoperative testing is positive or if she is symptomatic, surgery would be rescheduled.       All of the above was discussed with Diana Rutherford and all questions were answered.  She elected to proceed with MRI guided right breast biopsy.  She will consider her surgical options and call the office with a decision.    Total time spent with the patient was 90 minutes, of which 75% was counseling.     PLAN:  1. MRI guided RIGHT breast biopsy  2. Plastic surgery referral  3. RIGHT wire-localized segmental mastectomy  (3:00 cancer AND 6:00 atypia) vs RIGHT mastectomy (skin-sparing, nipple-sparing, or simple)  4. RIGHT axillary sentinel lymph node biopsy, possible axillary node dissection  5. Patient to report to her PCP for preoperative H&P and testing.    Jess Medina MD MSc [facility] FACS    Division of Surgical Oncology  [facility]           Again, thank you for allowing me to participate in the care of your patient.        Sincerely,        Jess Medina MD

## 2020-12-11 ENCOUNTER — PATIENT OUTREACH (OUTPATIENT)
Dept: SURGERY | Facility: CLINIC | Age: 45
End: 2020-12-11

## 2020-12-11 DIAGNOSIS — R92.8 ABNORMAL FINDING ON BREAST IMAGING: Primary | ICD-10-CM

## 2020-12-11 PROBLEM — Z21 ASYMPTOMATIC HIV INFECTION (H): Status: ACTIVE | Noted: 2018-06-28

## 2020-12-11 PROBLEM — J30.1 NON-SEASONAL ALLERGIC RHINITIS DUE TO POLLEN: Status: ACTIVE | Noted: 2018-06-28

## 2020-12-11 PROBLEM — M72.2 PLANTAR FASCIITIS: Status: ACTIVE | Noted: 2017-10-17

## 2020-12-11 PROBLEM — C50.811 MALIGNANT NEOPLASM OF OVERLAPPING SITES OF RIGHT BREAST IN FEMALE, ESTROGEN RECEPTOR POSITIVE (H): Status: ACTIVE | Noted: 2020-11-04

## 2020-12-11 PROBLEM — Z17.0 MALIGNANT NEOPLASM OF OVERLAPPING SITES OF RIGHT BREAST IN FEMALE, ESTROGEN RECEPTOR POSITIVE (H): Status: ACTIVE | Noted: 2020-11-04

## 2020-12-11 PROBLEM — C50.911: Status: ACTIVE | Noted: 2020-11-17

## 2020-12-11 NOTE — TELEPHONE ENCOUNTER
Surgical Oncology RN Care Coordination Note:     Returned call to patient and left a message for patient to call back if she still has additional questions. Direct number left for patient.     Diana Ritchie, RN, BSN  Care Coordinator

## 2020-12-11 NOTE — TELEPHONE ENCOUNTER
Surgical Oncology RN Care Coordination Note:     Called and spoke with patient regarding questions on the radiology report and reason for biopsy. Discussed with her that per Dr. Medina if we are going to consider breast conservation we would need to biopsy the lesion in the right breast that would not be taken out during the surgery to ensure it wasn't also cancer. All questions answered and she will continue as planned with appointments.     Diana Ritchie, RN, BSN  Care Coordinator

## 2020-12-14 ENCOUNTER — TELEPHONE (OUTPATIENT)
Dept: ONCOLOGY | Facility: CLINIC | Age: 45
End: 2020-12-14

## 2020-12-15 ENCOUNTER — VIRTUAL VISIT (OUTPATIENT)
Dept: PLASTIC SURGERY | Facility: CLINIC | Age: 45
End: 2020-12-15
Attending: PLASTIC SURGERY
Payer: COMMERCIAL

## 2020-12-15 DIAGNOSIS — C50.211 MALIGNANT NEOPLASM OF UPPER-INNER QUADRANT OF RIGHT BREAST IN FEMALE, ESTROGEN RECEPTOR POSITIVE (H): Primary | ICD-10-CM

## 2020-12-15 DIAGNOSIS — Z17.0 MALIGNANT NEOPLASM OF UPPER-INNER QUADRANT OF RIGHT BREAST IN FEMALE, ESTROGEN RECEPTOR POSITIVE (H): Primary | ICD-10-CM

## 2020-12-15 PROCEDURE — 99203 OFFICE O/P NEW LOW 30 MIN: CPT | Mod: 95 | Performed by: PLASTIC SURGERY

## 2020-12-15 NOTE — PROGRESS NOTES
VIDEO CONSULTATION NOTE      REFERRING PROVIDER:  Dr. Jess Medina, [facility]       PRESENTING COMPLAINT:  Consultation for right breast cancer.      HISTORY OF PRESENTING COMPLAINT:  Ms. Rutherford is 45 years old.  She has been diagnosed with right-sided breast cancer, potentially going to undergo a right-sided possible nipple-sparing mastectomy.  She is still contemplating lumpectomy versus mastectomy but is leaning towards mastectomy.  She is not interested in left-sided contralateral prophylactic mastectomy.  She wears a B cup on the right, C cup on the left.  She would like to be around the same size and symmetric.  Chances for radiation and chemotherapy are low but not zero.        PAST MEDICAL HISTORY:  HIV positive.      PAST SURGICAL HISTORY:  Hysterectomy, BSO through a Pfannenstiel scar.      MEDICATIONS:     1.  Biktarvy.        ALLERGIES:  Nil.      SOCIAL HISTORY:  Does not smoke, socially drinks.  Lives in [place], is a writer.      REVIEW OF SYSTEMS:  Denies chest pain, shortness of breath, MI, CVA, DVT and PE.      PHYSICAL EXAMINATION:     VITAL SIGNS:  Stable.  She is afebrile, in no obvious distress.  She is 5 feet 7 inches, 130 pounds.        Exam deferred.  I do not have pictures.      ASSESSMENT AND PLAN:  Based on above findings, a diagnosis of right breast cancer, undergoing possibly a right-sided mastectomy, interested in breast reconstruction was made.  I had a lm, detailed discussion through a video conference about breast reconstruction, elective nature of reconstruction, staged nature of reconstruction, implant versus autologous reconstruction, pros and cons of each.  The patient was very clear that she would like the least possible surgeries and the least invasive surgery as possible.  I discussed with her the use of an external prosthesis and then talked to her about different options.  I think, as long as a nipple-sparing mastectomy is possible, that should be the way to proceed to  give the best aesthetic outcome.  I did talk to her about unilateral reconstructions as the hardest to get symmetry and made her very clear about having realistic expectations.  I think the least possible surgeries in her case would possibly be a direct-to-implant reconstruction as long as blood flow to the skin flaps is good to excellent.  Otherwise, expander-based reconstruction followed by implant placement would be the next option.  Autologous reconstruction is probably not the best in her case given her wishes.  All questions were answered in detail.  She will think about it and let me know how she wants to proceed and if indeed she wants to proceed at our facility or not and then we will proceed as indicated.  I would like to examine her, obviously, or at least see her pictures to make final decisions.  She understood.  All questions were answered.  All exam done in the presence of my fellow.      Total time spent with the patient was 30 minutes, more than half counseling.      cc:   Jess Medina MD    [facility]    [address], [address] [address]

## 2020-12-15 NOTE — PROGRESS NOTES
"Diana Rutherford is a 45 year old female who is being evaluated via a billable video visit.      The patient has been notified of following:     \"This video visit will be conducted via a call between you and your physician/provider. We have found that certain health care needs can be provided without the need for an in-person physical exam.  This service lets us provide the care you need with a video conversation.  If a prescription is necessary we can send it directly to your pharmacy.  If lab work is needed we can place an order for that and you can then stop by our lab to have the test done at a later time.    Video visits are billed at different rates depending on your insurance coverage.  Please reach out to your insurance provider with any questions.    If during the course of the call the physician/provider feels a video visit is not appropriate, you will not be charged for this service.\"    Patient has given verbal consent for Video visit? Yes  How would you like to obtain your AVS? MyChart  If you are dropped from the video visit, the video invite should be resent to: Send to e-mail at: [email or web address]  Will anyone else be joining your video visit? No       CRUZ Souza      Video-Visit Details    Type of service:  Video Visit    Video Start Time: 0800  Video End Time: 0830    Originating Location (pt. Location): Home    Distant Location (provider location):  [facility]     Platform used for Video Visit: Cadence Preciado MD        "

## 2020-12-15 NOTE — TELEPHONE ENCOUNTER
Called patient and confirmed appointment with Dr. Medina for 1/4/20 at 4:30 PM. Patient is aware this is a phone visit. Patient inquired what the appointment was for and writer explained it is for biopsy results and consult for surgical planning. No further questions at this time.    spouse at bedside

## 2020-12-15 NOTE — LETTER
"    12/15/2020         RE: Diana Rutherford  [address] [facility] [place] [address]        Dear Colleague,    Thank you for referring your patient, Diana Rutherford, to the [facility]. Please see a copy of my visit note below.    Diana Rutherford is a 45 year old female who is being evaluated via a billable video visit.      The patient has been notified of following:     \"This video visit will be conducted via a call between you and your physician/provider. We have found that certain health care needs can be provided without the need for an in-person physical exam.  This service lets us provide the care you need with a video conversation.  If a prescription is necessary we can send it directly to your pharmacy.  If lab work is needed we can place an order for that and you can then stop by our lab to have the test done at a later time.    Video visits are billed at different rates depending on your insurance coverage.  Please reach out to your insurance provider with any questions.    If during the course of the call the physician/provider feels a video visit is not appropriate, you will not be charged for this service.\"    Patient has given verbal consent for Video visit? Yes  How would you like to obtain your AVS? MyChart  If you are dropped from the video visit, the video invite should be resent to: Send to e-mail at: [email or web address]  Will anyone else be joining your video visit? No       CRUZ Souza      Video-Visit Details    Type of service:  Video Visit    Video Start Time: 0800  Video End Time: 0830    Originating Location (pt. Location): Home    Distant Location (provider location):  [facility]     Platform used for Video Visit: Cadence Preciado MD          VIDEO CONSULTATION NOTE      REFERRING PROVIDER:  Dr. Jess Medina, [facility]       PRESENTING COMPLAINT:  Consultation for right breast cancer.      HISTORY OF PRESENTING " COMPLAINT:  Ms. Rutherford is 45 years old.  She has been diagnosed with right-sided breast cancer, potentially going to undergo a right-sided possible nipple-sparing mastectomy.  She is still contemplating lumpectomy versus mastectomy but is leaning towards mastectomy.  She is not interested in left-sided contralateral prophylactic mastectomy.  She wears a B cup on the right, C cup on the left.  She would like to be around the same size and symmetric.  Chances for radiation and chemotherapy are low but not zero.        PAST MEDICAL HISTORY:  HIV positive.      PAST SURGICAL HISTORY:  Hysterectomy, BSO through a Pfannenstiel scar.      MEDICATIONS:     1.  Biktarvy.        ALLERGIES:  Nil.      SOCIAL HISTORY:  Does not smoke, socially drinks.  Lives in [place], is a writer.      REVIEW OF SYSTEMS:  Denies chest pain, shortness of breath, MI, CVA, DVT and PE.      PHYSICAL EXAMINATION:     VITAL SIGNS:  Stable.  She is afebrile, in no obvious distress.  She is 5 feet 7 inches, 130 pounds.        Exam deferred.  I do not have pictures.      ASSESSMENT AND PLAN:  Based on above findings, a diagnosis of right breast cancer, undergoing possibly a right-sided mastectomy, interested in breast reconstruction was made.  I had a lm, detailed discussion through a video conference about breast reconstruction, elective nature of reconstruction, staged nature of reconstruction, implant versus autologous reconstruction, pros and cons of each.  The patient was very clear that she would like the least possible surgeries and the least invasive surgery as possible.  I discussed with her the use of an external prosthesis and then talked to her about different options.  I think, as long as a nipple-sparing mastectomy is possible, that should be the way to proceed to give the best aesthetic outcome.  I did talk to her about unilateral reconstructions as the hardest to get symmetry and made her very clear about having realistic  expectations.  I think the least possible surgeries in her case would possibly be a direct-to-implant reconstruction as long as blood flow to the skin flaps is good to excellent.  Otherwise, expander-based reconstruction followed by implant placement would be the next option.  Autologous reconstruction is probably not the best in her case given her wishes.  All questions were answered in detail.  She will think about it and let me know how she wants to proceed and if indeed she wants to proceed at our facility or not and then we will proceed as indicated.  I would like to examine her, obviously, or at least see her pictures to make final decisions.  She understood.  All questions were answered.  All exam done in the presence of my fellow.      Total time spent with the patient was 30 minutes, more than half counseling.      cc:   Jess Medina MD    [address] [address]           Again, thank you for allowing me to participate in the care of your patient.        Sincerely,        MARY Preciado MD

## 2021-01-09 ENCOUNTER — HEALTH MAINTENANCE LETTER (OUTPATIENT)
Age: 46
End: 2021-01-09

## 2021-10-11 ENCOUNTER — HEALTH MAINTENANCE LETTER (OUTPATIENT)
Age: 46
End: 2021-10-11

## 2022-01-30 ENCOUNTER — HEALTH MAINTENANCE LETTER (OUTPATIENT)
Age: 47
End: 2022-01-30

## 2022-09-24 ENCOUNTER — HEALTH MAINTENANCE LETTER (OUTPATIENT)
Age: 47
End: 2022-09-24

## 2023-01-30 ENCOUNTER — HEALTH MAINTENANCE LETTER (OUTPATIENT)
Age: 48
End: 2023-01-30

## 2023-05-08 ENCOUNTER — HEALTH MAINTENANCE LETTER (OUTPATIENT)
Age: 48
End: 2023-05-08

## 2024-03-02 ENCOUNTER — HEALTH MAINTENANCE LETTER (OUTPATIENT)
Age: 49
End: 2024-03-02